# Patient Record
Sex: FEMALE | Race: BLACK OR AFRICAN AMERICAN | NOT HISPANIC OR LATINO | Employment: FULL TIME | URBAN - METROPOLITAN AREA
[De-identification: names, ages, dates, MRNs, and addresses within clinical notes are randomized per-mention and may not be internally consistent; named-entity substitution may affect disease eponyms.]

---

## 2024-01-01 ENCOUNTER — APPOINTMENT (OUTPATIENT)
Dept: RADIOLOGY | Facility: HOSPITAL | Age: 54
End: 2024-01-01
Payer: COMMERCIAL

## 2024-01-01 ENCOUNTER — HOSPITAL ENCOUNTER (EMERGENCY)
Facility: HOSPITAL | Age: 54
Discharge: HOME | End: 2024-01-02
Attending: EMERGENCY MEDICINE
Payer: COMMERCIAL

## 2024-01-01 DIAGNOSIS — S43.015A ANTERIOR DISLOCATION OF LEFT SHOULDER, INITIAL ENCOUNTER: Primary | ICD-10-CM

## 2024-01-01 PROCEDURE — 99152 MOD SED SAME PHYS/QHP 5/>YRS: CPT

## 2024-01-01 PROCEDURE — 2500000005 HC RX 250 GENERAL PHARMACY W/O HCPCS: Performed by: EMERGENCY MEDICINE

## 2024-01-01 PROCEDURE — 99285 EMERGENCY DEPT VISIT HI MDM: CPT | Performed by: EMERGENCY MEDICINE

## 2024-01-01 PROCEDURE — 73030 X-RAY EXAM OF SHOULDER: CPT | Mod: LEFT SIDE | Performed by: RADIOLOGY

## 2024-01-01 PROCEDURE — 96374 THER/PROPH/DIAG INJ IV PUSH: CPT

## 2024-01-01 PROCEDURE — 99284 EMERGENCY DEPT VISIT MOD MDM: CPT | Mod: 25

## 2024-01-01 PROCEDURE — 23650 CLTX SHO DSLC W/MNPJ WO ANES: CPT | Performed by: EMERGENCY MEDICINE

## 2024-01-01 PROCEDURE — 73020 X-RAY EXAM OF SHOULDER: CPT | Mod: LEFT SIDE | Performed by: RADIOLOGY

## 2024-01-01 PROCEDURE — 96375 TX/PRO/DX INJ NEW DRUG ADDON: CPT

## 2024-01-01 PROCEDURE — 2500000004 HC RX 250 GENERAL PHARMACY W/ HCPCS (ALT 636 FOR OP/ED): Performed by: EMERGENCY MEDICINE

## 2024-01-01 PROCEDURE — 73020 X-RAY EXAM OF SHOULDER: CPT | Mod: LT

## 2024-01-01 PROCEDURE — 73030 X-RAY EXAM OF SHOULDER: CPT | Mod: LT

## 2024-01-01 PROCEDURE — 99285 EMERGENCY DEPT VISIT HI MDM: CPT | Mod: 25

## 2024-01-01 PROCEDURE — 2500000004 HC RX 250 GENERAL PHARMACY W/ HCPCS (ALT 636 FOR OP/ED)

## 2024-01-01 RX ORDER — KETAMINE HYDROCHLORIDE 50 MG/ML
1 INJECTION, SOLUTION INTRAMUSCULAR; INTRAVENOUS ONCE
Status: COMPLETED | OUTPATIENT
Start: 2024-01-01 | End: 2024-01-01

## 2024-01-01 RX ORDER — PROPOFOL 10 MG/ML
INJECTION, EMULSION INTRAVENOUS
Status: DISCONTINUED
Start: 2024-01-01 | End: 2024-01-01 | Stop reason: WASHOUT

## 2024-01-01 RX ORDER — PROPOFOL 10 MG/ML
INJECTION, EMULSION INTRAVENOUS
Status: COMPLETED
Start: 2024-01-01 | End: 2024-01-01

## 2024-01-01 RX ORDER — KETAMINE HYDROCHLORIDE 50 MG/ML
0.5 INJECTION, SOLUTION INTRAMUSCULAR; INTRAVENOUS AS NEEDED
Status: DISCONTINUED | OUTPATIENT
Start: 2024-01-01 | End: 2024-01-02 | Stop reason: HOSPADM

## 2024-01-01 RX ADMIN — PROPOFOL 50 MG: 10 INJECTION, EMULSION INTRAVENOUS at 23:51

## 2024-01-01 RX ADMIN — PROPOFOL 60 MG: 10 INJECTION, EMULSION INTRAVENOUS at 23:17

## 2024-01-01 RX ADMIN — PROPOFOL 40 MG: 10 INJECTION, EMULSION INTRAVENOUS at 23:20

## 2024-01-01 RX ADMIN — KETAMINE HYDROCHLORIDE 129 MG: 50 INJECTION, SOLUTION INTRAMUSCULAR; INTRAVENOUS at 23:05

## 2024-01-01 RX ADMIN — Medication 2 L/MIN: at 23:00

## 2024-01-01 ASSESSMENT — COLUMBIA-SUICIDE SEVERITY RATING SCALE - C-SSRS
2. HAVE YOU ACTUALLY HAD ANY THOUGHTS OF KILLING YOURSELF?: NO
6. HAVE YOU EVER DONE ANYTHING, STARTED TO DO ANYTHING, OR PREPARED TO DO ANYTHING TO END YOUR LIFE?: NO
1. IN THE PAST MONTH, HAVE YOU WISHED YOU WERE DEAD OR WISHED YOU COULD GO TO SLEEP AND NOT WAKE UP?: NO

## 2024-01-01 ASSESSMENT — PAIN - FUNCTIONAL ASSESSMENT: PAIN_FUNCTIONAL_ASSESSMENT: 0-10

## 2024-01-01 ASSESSMENT — PAIN DESCRIPTION - PAIN TYPE: TYPE: ACUTE PAIN

## 2024-01-01 ASSESSMENT — PAIN DESCRIPTION - LOCATION: LOCATION: SHOULDER

## 2024-01-01 ASSESSMENT — PAIN SCALES - GENERAL: PAINLEVEL_OUTOF10: 9

## 2024-01-01 ASSESSMENT — PAIN DESCRIPTION - ORIENTATION: ORIENTATION: LEFT

## 2024-01-02 VITALS
HEART RATE: 79 BPM | WEIGHT: 284 LBS | HEIGHT: 62 IN | SYSTOLIC BLOOD PRESSURE: 139 MMHG | RESPIRATION RATE: 18 BRPM | BODY MASS INDEX: 52.26 KG/M2 | OXYGEN SATURATION: 100 % | DIASTOLIC BLOOD PRESSURE: 79 MMHG | TEMPERATURE: 98.4 F

## 2024-01-02 PROCEDURE — 2500000001 HC RX 250 WO HCPCS SELF ADMINISTERED DRUGS (ALT 637 FOR MEDICARE OP): Performed by: EMERGENCY MEDICINE

## 2024-01-02 RX ORDER — TRAMADOL HYDROCHLORIDE 50 MG/1
25 TABLET ORAL ONCE
Status: COMPLETED | OUTPATIENT
Start: 2024-01-02 | End: 2024-01-02

## 2024-01-02 RX ADMIN — TRAMADOL HYDROCHLORIDE 25 MG: 50 TABLET, COATED ORAL at 00:56

## 2024-01-02 ASSESSMENT — PAIN - FUNCTIONAL ASSESSMENT: PAIN_FUNCTIONAL_ASSESSMENT: 0-10

## 2024-01-02 ASSESSMENT — PAIN SCALES - GENERAL: PAINLEVEL_OUTOF10: 5 - MODERATE PAIN

## 2024-01-02 NOTE — ED PROVIDER NOTES
Chief Complaint   Patient presents with    Shoulder Injury     History of Present Illness   Sherrie Kenney   MRN [id number]    53-year-old presents for evaluation of left shoulder pain and suspected shoulder dislocation.  She states that shortly prior to arrival she was attempting to get out of her car and slipped on to the ground falling onto her left shoulder.  Did not strike her head or lose consciousness.  No anticoagulation.  States that she has dislocated her shoulder numerous times.  Reports no weakness or numbness of extremities.  Reports no injuries or pain on other than the left shoulder.  Declines medication for pain prior to sedation.    Physical Exam     ED Triage Vitals [01/01/24 1905]   Temp Heart Rate Resp BP   36.9 °C (98.4 °F) 96 16 (!) 156/100      SpO2 Temp src Heart Rate Source Patient Position   99 % -- -- Sitting      BP Location FiO2 (%)     Right arm --         General:  No acute distress.  Head: Atraumatic.  Eyes: No conjunctival injection.   Ears Nose Throat: Hearing grossly intact. No epistaxis. Oral mucosa moist.  Neck: Supple.  Cardiovascular: Extremities warm.  2+ left radial pulse.  Pulmonary: No stridor. Normal respiratory effort.  Abdominal: No distention.  Musculoskeletal: Left shoulder deformity tender to palpation without overlying soft tissue swelling or skin changes.  Left elbow wrist and hand nontender without swelling.  Skin: No rash.  Psychiatric: Does not appear to respond to internal stimuli.  Neurologic: Alert. Follows directions. Face symmetric. No aphasia or dysarthria.  Limited range of motion of the left shoulder due to pain and deformity.  Left wrist flexion and extension 5/5.  Sensation to light touch in median ulnar and radial nerve distributions intact.    ED Course & Medical Decision Making   Initial vital signs notable for elevated blood pressure 156/100.  Patient was uncomfortable but nontoxic-appearing.  Exam notable for close deformity of the left shoulder.   X-ray of the shoulder confirmed suspected anterior-inferior dislocation of the humeral head.  There was a Hill-Sachs deformity present.  She declined analgesic stating that she preferred to wait until procedural sedation to receive medication.  Patient was consented for procedural sedation and dislocation reduction.  Performed with ketamine and subsequently addition of propofol to achieve adequate sedation and muscle relaxation.  Patient tolerated sedation well and shoulder was successfully reduced.  Repeat x-ray confirmed interval reduction of glenohumeral dislocation.  Radiologist suspected ossific density likely Bankart fracture.  Left upper extremity placed in a sling for comfort.  Recovered from sedation and was neurovascularly intact on repeat assessment.  Referral for follow-up with orthopedic surgery placed given patient's description of recurrent shoulder dislocation.  Return precautions that should prompt return to the emergency department were discussed.  Patient discharged in good condition in care of family members.    Diagnoses as of 01/03/24 1955   Anterior dislocation of left shoulder, initial encounter            Adonis Blankenship MD  01/03/24 2008

## 2024-01-02 NOTE — ED PROCEDURE NOTE
Procedure  Procedural Sedation    Performed by: Adonis Blankenship MD  Authorized by: Adonis Blankenship MD    Consent:     Consent obtained:  Verbal and written    Consent given by:  Patient  Universal protocol:     Procedure explained and questions answered to patient or proxy's satisfaction: yes      Relevant documents present and verified: yes      Test results available: yes      Imaging studies available: yes      Required blood products, implants, devices, and special equipment available: yes      Immediately prior to procedure, a time out was called: yes      Patient identity confirmed:  Verbally with patient and arm band  Indications:     Procedure performed:  Dislocation reduction    Intended level of sedation:  Deep  Pre-sedation assessment:     NPO status caution: urgency dictates proceeding with non-ideal NPO status      ASA classification: class 2 - patient with mild systemic disease      Mouth opening:  3 or more finger widths    Mallampati score:  II - soft palate, uvula, fauces visible    Neck mobility: normal      Pre-sedation assessments completed and reviewed: airway patency, anesthesia/sedation history, hydration status, mental status, pain level and respiratory function      History of difficult intubation: no    Immediate pre-procedure details:     Reviewed: vital signs and relevant labs/tests      Verified: bag valve mask available, emergency equipment available, intubation equipment available, IV patency confirmed, oxygen available and suction available    Procedure details (see MAR for exact dosages):     Sedation start time:  1/1/2024 11:00 PM    Preoxygenation:  Nasal cannula    Sedation:  Ketamine (ketamine 129 mg, propofol 150)    Intra-procedure monitoring:  Blood pressure monitoring, continuous capnometry, frequent LOC assessments, cardiac monitor, continuous pulse oximetry and frequent vital sign checks    Intra-procedure events: none      Sedation end time:  1/1/2024 11:35  PM  Post-procedure details:     Attendance: Constant attendance by certified staff until patient recovered      Recovery: Patient returned to pre-procedure baseline      Post-sedation assessments completed and reviewed: airway patency, cardiovascular function, mental status, nausea/vomiting, respiratory function and temperature      Patient is stable for discharge or admission: yes      Procedure completion:  Tolerated well, no immediate complications               Adonis Blankenship MD  01/02/24 0058

## 2024-01-02 NOTE — DISCHARGE INSTRUCTIONS
Referral placed for follow-up with orthopedic surgery for recurrent shoulder dislocation.  Return to emergency department for reassessment if new or worsening symptoms.

## 2025-02-27 ENCOUNTER — APPOINTMENT (OUTPATIENT)
Dept: ENDOCRINOLOGY | Facility: CLINIC | Age: 55
End: 2025-02-27
Payer: COMMERCIAL

## 2025-05-21 ENCOUNTER — TELEPHONE (OUTPATIENT)
Dept: ENDOCRINOLOGY | Facility: CLINIC | Age: 55
End: 2025-05-21
Payer: COMMERCIAL

## 2025-05-21 NOTE — TELEPHONE ENCOUNTER
lvm appt reminder/ office phone number provided for call back.   Need to review medications/ wt, fall screening, depression screening, history,medical and family with the patient/ is there any surgical history.

## 2025-05-22 ENCOUNTER — APPOINTMENT (OUTPATIENT)
Dept: ENDOCRINOLOGY | Facility: CLINIC | Age: 55
End: 2025-05-22
Payer: COMMERCIAL

## 2025-05-22 ENCOUNTER — TELEPHONE (OUTPATIENT)
Dept: ENDOCRINOLOGY | Facility: CLINIC | Age: 55
End: 2025-05-22

## 2025-05-22 NOTE — TELEPHONE ENCOUNTER
lvm regarding televisit for patient to go through preliminary questions. Request patient call office back phone number provided.   05/22/2025 1031 patient returns call to the office/ she states that she is at [place] [facility] with relative in an emergency will need to cancel appt request return call on Tues. To reschedule the visit.    to be notified.